# Patient Record
(demographics unavailable — no encounter records)

---

## 2024-11-13 NOTE — PHYSICAL EXAM
[Bilateral] : foot and ankle bilaterally [2nd] : 2nd [Mild] : mild diffused ankle swelling [1st] : 1st [4___] : inversion 4[unfilled]/5 [] : not able to perform single heel raise [de-identified] : tingling/stinging

## 2024-11-13 NOTE — HISTORY OF PRESENT ILLNESS
[Gradual] : gradual [4] : 4 [3] : 3 [Localized] : localized [Constant] : constant [Household chores] : household chores [Rest] : rest [Standing] : standing [Walking] : walking [Retired] : Work status: retired [Tingling] : tingling [de-identified] : 9/4/24: Patient here for further evaluation of b/l bunion pain. She also c/o right ankle pain. Pain has gotten worse recently. She has tried changing her shoes without relief.  10/2/24  f/u bilateral R > L pain.  Had transient pain medial midfoot since resolved.  Using braces  HEP 11/13/24  f/u R foot /ankle.  Improving, uses brace HEP 7# wt loss [] : no [FreeTextEntry1] : rt foot  [FreeTextEntry5] : thursday , pt got up and couldnt put weight on rt foot  [FreeTextEntry9] : braces, HEP [de-identified] : worse at end of day